# Patient Record
Sex: FEMALE
[De-identification: names, ages, dates, MRNs, and addresses within clinical notes are randomized per-mention and may not be internally consistent; named-entity substitution may affect disease eponyms.]

---

## 2024-07-24 ENCOUNTER — APPOINTMENT (OUTPATIENT)
Dept: PEDIATRIC ORTHOPEDIC SURGERY | Facility: CLINIC | Age: 74
End: 2024-07-24
Payer: MEDICARE

## 2024-07-24 VITALS — WEIGHT: 152 LBS | HEIGHT: 64 IN | BODY MASS INDEX: 25.95 KG/M2

## 2024-07-24 DIAGNOSIS — Z86.39 PERSONAL HISTORY OF OTHER ENDOCRINE, NUTRITIONAL AND METABOLIC DISEASE: ICD-10-CM

## 2024-07-24 DIAGNOSIS — S82.035A NONDISPLACED TRANSVERSE FRACTURE OF LEFT PATELLA, INITIAL ENCOUNTER FOR CLOSED FRACTURE: ICD-10-CM

## 2024-07-24 DIAGNOSIS — S82.021A DISPLACED LONGITUDINAL FRACTURE OF RIGHT PATELLA, INITIAL ENCOUNTER FOR CLOSED FRACTURE: ICD-10-CM

## 2024-07-24 DIAGNOSIS — Z87.39 PERSONAL HISTORY OF OTHER DISEASES OF THE MUSCULOSKELETAL SYSTEM AND CONNECTIVE TISSUE: ICD-10-CM

## 2024-07-24 DIAGNOSIS — Z83.3 FAMILY HISTORY OF DIABETES MELLITUS: ICD-10-CM

## 2024-07-24 DIAGNOSIS — M17.32 UNILATERAL POST-TRAUMATIC OSTEOARTHRITIS, LEFT KNEE: ICD-10-CM

## 2024-07-24 DIAGNOSIS — Z82.61 FAMILY HISTORY OF ARTHRITIS: ICD-10-CM

## 2024-07-24 PROBLEM — Z00.00 ENCOUNTER FOR PREVENTIVE HEALTH EXAMINATION: Status: ACTIVE | Noted: 2024-07-24

## 2024-07-24 PROCEDURE — 99202 OFFICE O/P NEW SF 15 MIN: CPT

## 2024-07-24 PROCEDURE — 73562 X-RAY EXAM OF KNEE 3: CPT | Mod: LT

## 2024-07-24 RX ORDER — DICLOFENAC SODIUM 50 MG/1
50 TABLET, DELAYED RELEASE ORAL TWICE DAILY
Qty: 30 | Refills: 2 | Status: ACTIVE | COMMUNITY
Start: 2024-07-24 | End: 1900-01-01

## 2024-07-26 PROBLEM — S82.021A: Status: ACTIVE | Noted: 2024-07-26

## 2024-07-26 PROBLEM — S82.035A CLOSED NONDISPLACED TRANSVERSE FRACTURE OF LEFT PATELLA, INITIAL ENCOUNTER: Status: ACTIVE | Noted: 2024-07-26

## 2024-07-26 PROBLEM — M17.32 POST-TRAUMATIC OSTEOARTHRITIS OF LEFT KNEE: Status: ACTIVE | Noted: 2024-07-26

## 2024-07-26 NOTE — ASSESSMENT
[FreeTextEntry1] : Transverse nondisplaced fracture left patella (healed) Mild posttraumatic arthritis left knee  This patient will be treated with anti-inflammatory medication and she will return for reevaluation in approximately 3 weeks.

## 2024-07-26 NOTE — CONSULT LETTER
[Consult Letter:] : I had the pleasure of evaluating your patient, [unfilled]. [Please see my note below.] : Please see my note below. [Consult Closing:] : Thank you very much for allowing me to participate in the care of this patient.  If you have any questions, please do not hesitate to contact me. [Sincerely,] : Sincerely, [FreeTextEntry3] : Dr Harley

## 2024-07-26 NOTE — PHYSICAL EXAM
[de-identified] : On physical examination patient has mild crepitus on range of motion of the left knee in the patellofemoral joint.  There is no effusion and no varus valgus or AP instability.  The fracture site is not tender at this time. [de-identified] : X-ray evaluation of the left knee on 7/24/2024 (AP, lateral and patella views) reveals a healed patella fracture in good position with minimal degenerative changes in the patellofemoral joint.

## 2024-07-26 NOTE — HISTORY OF PRESENT ILLNESS
[de-identified] : This 74-year-old female is 2 months status post fall onto her left knee which resulted in a nondisplaced transverse patella fracture.  The patient has recently began having pain in the region of the fracture site.  There has been no new injury.

## 2024-08-21 ENCOUNTER — APPOINTMENT (OUTPATIENT)
Dept: PEDIATRIC ORTHOPEDIC SURGERY | Facility: CLINIC | Age: 74
End: 2024-08-21
Payer: MEDICARE

## 2024-08-21 DIAGNOSIS — S82.035A NONDISPLACED TRANSVERSE FRACTURE OF LEFT PATELLA, INITIAL ENCOUNTER FOR CLOSED FRACTURE: ICD-10-CM

## 2024-08-21 PROCEDURE — 99212 OFFICE O/P EST SF 10 MIN: CPT

## 2024-08-21 NOTE — ASSESSMENT
[FreeTextEntry1] : Impression: Status post fracture of the left patella with mild arthritic change.  She will continue with a home exercise program and Tylenol return on appearing basis

## 2024-08-21 NOTE — PHYSICAL EXAM
[de-identified] : Exam she has no limp small effusion only with good motion no instability on stress minimal discomfort in the medial compartment otherwise a benign exam

## 2024-08-21 NOTE — HISTORY OF PRESENT ILLNESS
[de-identified] : This 74-year-old returns for follow-up of her left knee.  She is doing very well she has only occasional knee discomfort mild stiffness otherwise she is ambulating and functioning